# Patient Record
Sex: MALE | Race: WHITE
[De-identification: names, ages, dates, MRNs, and addresses within clinical notes are randomized per-mention and may not be internally consistent; named-entity substitution may affect disease eponyms.]

---

## 2021-03-03 DIAGNOSIS — Z23 NEED FOR VACCINATION: ICD-10-CM

## 2023-05-31 ENCOUNTER — HOSPITAL ENCOUNTER (EMERGENCY)
Dept: HOSPITAL 54 - ER | Age: 73
Discharge: PSYCHIATRIC OR SUBSTANCE USE FACILITY | End: 2023-05-31
Payer: COMMERCIAL

## 2023-05-31 VITALS — WEIGHT: 181 LBS | BODY MASS INDEX: 26.81 KG/M2 | HEIGHT: 69 IN

## 2023-05-31 VITALS — DIASTOLIC BLOOD PRESSURE: 87 MMHG | SYSTOLIC BLOOD PRESSURE: 138 MMHG

## 2023-05-31 DIAGNOSIS — R45.851: Primary | ICD-10-CM

## 2023-05-31 DIAGNOSIS — Z60.2: ICD-10-CM

## 2023-05-31 DIAGNOSIS — F32.9: ICD-10-CM

## 2023-05-31 DIAGNOSIS — Z20.822: ICD-10-CM

## 2023-05-31 LAB
ALBUMIN SERPL BCP-MCNC: 4.3 G/DL (ref 3.4–5)
ALP SERPL-CCNC: 65 U/L (ref 46–116)
ALT SERPL W P-5'-P-CCNC: 32 U/L (ref 12–78)
AST SERPL W P-5'-P-CCNC: 27 U/L (ref 15–37)
BASOPHILS # BLD AUTO: 0 K/UL (ref 0–0.2)
BASOPHILS NFR BLD AUTO: 0.5 % (ref 0–2)
BILIRUB DIRECT SERPL-MCNC: 0.1 MG/DL (ref 0–0.2)
BILIRUB SERPL-MCNC: 0.7 MG/DL (ref 0.2–1)
BILIRUB UR QL STRIP: NEGATIVE
BUN SERPL-MCNC: 21 MG/DL (ref 7–18)
CALCIUM SERPL-MCNC: 9.3 MG/DL (ref 8.5–10.1)
CHLORIDE SERPL-SCNC: 102 MMOL/L (ref 98–107)
CO2 SERPL-SCNC: 25 MMOL/L (ref 21–32)
COLOR UR: YELLOW
CREAT SERPL-MCNC: 0.9 MG/DL (ref 0.6–1.3)
EOSINOPHIL NFR BLD AUTO: 1.7 % (ref 0–6)
ETHANOL SERPL-MCNC: < 3 MG/DL (ref 0–10)
GLUCOSE SERPL-MCNC: 102 MG/DL (ref 74–106)
GLUCOSE UR STRIP-MCNC: NEGATIVE MG/DL
HCT VFR BLD AUTO: 44 % (ref 39–51)
HGB BLD-MCNC: 15.6 G/DL (ref 13.5–17.5)
LEUKOCYTE ESTERASE UR QL STRIP: NEGATIVE
LYMPHOCYTES NFR BLD AUTO: 1.4 K/UL (ref 0.8–4.8)
LYMPHOCYTES NFR BLD AUTO: 22.2 % (ref 20–44)
MCHC RBC AUTO-ENTMCNC: 35 G/DL (ref 31–36)
MCV RBC AUTO: 87 FL (ref 80–96)
MONOCYTES NFR BLD AUTO: 0.7 K/UL (ref 0.1–1.3)
MONOCYTES NFR BLD AUTO: 11.3 % (ref 2–12)
NEUTROPHILS # BLD AUTO: 4 K/UL (ref 1.8–8.9)
NEUTROPHILS NFR BLD AUTO: 64.3 % (ref 43–81)
NITRITE UR QL STRIP: NEGATIVE
PH UR STRIP: 6 [PH] (ref 5–8)
PLATELET # BLD AUTO: 218 K/UL (ref 150–450)
POTASSIUM SERPL-SCNC: 4.1 MMOL/L (ref 3.5–5.1)
PROT SERPL-MCNC: 7.6 G/DL (ref 6.4–8.2)
PROT UR QL STRIP: NEGATIVE MG/DL
RBC # BLD AUTO: 5.09 MIL/UL (ref 4.5–6)
SODIUM SERPL-SCNC: 137 MMOL/L (ref 136–145)
UROBILINOGEN UR STRIP-MCNC: 0.2 EU/DL
WBC NRBC COR # BLD AUTO: 6.2 K/UL (ref 4.3–11)

## 2023-05-31 PROCEDURE — 87426 SARSCOV CORONAVIRUS AG IA: CPT

## 2023-05-31 PROCEDURE — 81003 URINALYSIS AUTO W/O SCOPE: CPT

## 2023-05-31 PROCEDURE — 99285 EMERGENCY DEPT VISIT HI MDM: CPT

## 2023-05-31 PROCEDURE — 80048 BASIC METABOLIC PNL TOTAL CA: CPT

## 2023-05-31 PROCEDURE — 80320 DRUG SCREEN QUANTALCOHOLS: CPT

## 2023-05-31 PROCEDURE — 85025 COMPLETE CBC W/AUTO DIFF WBC: CPT

## 2023-05-31 PROCEDURE — 80143 DRUG ASSAY ACETAMINOPHEN: CPT

## 2023-05-31 PROCEDURE — 80307 DRUG TEST PRSMV CHEM ANLYZR: CPT

## 2023-05-31 PROCEDURE — C9803 HOPD COVID-19 SPEC COLLECT: HCPCS

## 2023-05-31 PROCEDURE — 80076 HEPATIC FUNCTION PANEL: CPT

## 2023-05-31 PROCEDURE — G0480 DRUG TEST DEF 1-7 CLASSES: HCPCS

## 2023-05-31 PROCEDURE — 36415 COLL VENOUS BLD VENIPUNCTURE: CPT

## 2023-05-31 SDOH — SOCIAL STABILITY - SOCIAL INSECURITY: PROBLEMS RELATED TO LIVING ALONE: Z60.2

## 2023-05-31 NOTE — NUR
Patient discharged to [facility] in stable condition. Written and verbal after care 
instructions given. Patient verbalizes understanding of instruction.

## 2023-05-31 NOTE — NUR
Patient camein to the er want's voluntary admission to psych, +SI no plan. Kept 
comfortable, will continue to monitor accordingly. Sitter at bedside for 
constant monitoring.

## 2023-05-31 NOTE — NUR
SW Consult:



SW consult requested for pt. Pt appeared to be alert and oriented x3 
(self,place,situation). Pt was cooperative. He stated that he was brought to 
the hospital due to experiencing insomnia. He stated that he is not on 
medications. He has no supportive system. Pt denies suicidal or homicidal 
ideation. He stated that he is homeless and is in the [facility]. He stated 
that he has been homeless for a while. He stated in the past he has attempted 
SI and has attempted to overdose on medications. Pt would want voluntary 
admission. Homeless waiver form in pt's chart. 



DC PLAN: Clinicals sent to [facility].

## 2023-06-12 ENCOUNTER — HOSPITAL ENCOUNTER (EMERGENCY)
Dept: HOSPITAL 54 - ER | Age: 73
Discharge: PSYCHIATRIC OR SUBSTANCE USE FACILITY | End: 2023-06-12
Payer: COMMERCIAL

## 2023-06-12 VITALS — HEIGHT: 69 IN | BODY MASS INDEX: 26.66 KG/M2 | WEIGHT: 180 LBS

## 2023-06-12 VITALS — DIASTOLIC BLOOD PRESSURE: 88 MMHG | SYSTOLIC BLOOD PRESSURE: 148 MMHG

## 2023-06-12 DIAGNOSIS — Z60.2: ICD-10-CM

## 2023-06-12 DIAGNOSIS — Z20.822: ICD-10-CM

## 2023-06-12 DIAGNOSIS — F32.A: Primary | ICD-10-CM

## 2023-06-12 LAB
BASOPHILS # BLD AUTO: 0 K/UL (ref 0–0.2)
BASOPHILS NFR BLD AUTO: 0.4 % (ref 0–2)
BILIRUB UR QL STRIP: NEGATIVE
BUN SERPL-MCNC: 27 MG/DL (ref 7–18)
CALCIUM SERPL-MCNC: 9.1 MG/DL (ref 8.5–10.1)
CHLORIDE SERPL-SCNC: 95 MMOL/L (ref 98–107)
CO2 SERPL-SCNC: 28 MMOL/L (ref 21–32)
COLOR UR: YELLOW
CREAT SERPL-MCNC: 1.1 MG/DL (ref 0.6–1.3)
EOSINOPHIL NFR BLD AUTO: 2.7 % (ref 0–6)
ETHANOL SERPL-MCNC: < 3 MG/DL (ref 0–10)
GLUCOSE SERPL-MCNC: 104 MG/DL (ref 74–106)
GLUCOSE UR STRIP-MCNC: NEGATIVE MG/DL
HCT VFR BLD AUTO: 46 % (ref 39–51)
HGB BLD-MCNC: 15.5 G/DL (ref 13.5–17.5)
LEUKOCYTE ESTERASE UR QL STRIP: NEGATIVE
LYMPHOCYTES NFR BLD AUTO: 1.8 K/UL (ref 0.8–4.8)
LYMPHOCYTES NFR BLD AUTO: 28.3 % (ref 20–44)
MCHC RBC AUTO-ENTMCNC: 34 G/DL (ref 31–36)
MCV RBC AUTO: 90 FL (ref 80–96)
MONOCYTES NFR BLD AUTO: 0.8 K/UL (ref 0.1–1.3)
MONOCYTES NFR BLD AUTO: 13.2 % (ref 2–12)
NEUTROPHILS # BLD AUTO: 3.6 K/UL (ref 1.8–8.9)
NEUTROPHILS NFR BLD AUTO: 55.4 % (ref 43–81)
NITRITE UR QL STRIP: NEGATIVE
PH UR STRIP: 6.5 [PH] (ref 5–8)
PLATELET # BLD AUTO: 182 K/UL (ref 150–450)
POTASSIUM SERPL-SCNC: 3.9 MMOL/L (ref 3.5–5.1)
PROT UR QL STRIP: NEGATIVE MG/DL
RBC # BLD AUTO: 5.13 MIL/UL (ref 4.5–6)
SODIUM SERPL-SCNC: 130 MMOL/L (ref 136–145)
UROBILINOGEN UR STRIP-MCNC: 0.2 EU/DL
WBC NRBC COR # BLD AUTO: 6.4 K/UL (ref 4.3–11)

## 2023-06-12 PROCEDURE — 87426 SARSCOV CORONAVIRUS AG IA: CPT

## 2023-06-12 PROCEDURE — 80320 DRUG SCREEN QUANTALCOHOLS: CPT

## 2023-06-12 PROCEDURE — G0480 DRUG TEST DEF 1-7 CLASSES: HCPCS

## 2023-06-12 PROCEDURE — 96360 HYDRATION IV INFUSION INIT: CPT

## 2023-06-12 PROCEDURE — 85025 COMPLETE CBC W/AUTO DIFF WBC: CPT

## 2023-06-12 PROCEDURE — 99285 EMERGENCY DEPT VISIT HI MDM: CPT

## 2023-06-12 PROCEDURE — 80307 DRUG TEST PRSMV CHEM ANLYZR: CPT

## 2023-06-12 PROCEDURE — 36415 COLL VENOUS BLD VENIPUNCTURE: CPT

## 2023-06-12 PROCEDURE — 81003 URINALYSIS AUTO W/O SCOPE: CPT

## 2023-06-12 PROCEDURE — 80048 BASIC METABOLIC PNL TOTAL CA: CPT

## 2023-06-12 PROCEDURE — C9803 HOPD COVID-19 SPEC COLLECT: HCPCS

## 2023-06-12 PROCEDURE — 80143 DRUG ASSAY ACETAMINOPHEN: CPT

## 2023-06-12 SDOH — SOCIAL STABILITY - SOCIAL INSECURITY: PROBLEMS RELATED TO LIVING ALONE: Z60.2

## 2023-06-12 NOTE — NUR
PT BEING TRANSFERRED TO [facility] VIA PRIVATE TRANSPORTATION. ALL BELONGINGS WITH 
PT.

## 2023-06-12 NOTE — NUR
PT WAS ACCEPTED AT [facility] AT [place] UNDER CARE OF DR RUTLEDGE.  
WILL PICK HIM UP